# Patient Record
Sex: FEMALE
[De-identification: names, ages, dates, MRNs, and addresses within clinical notes are randomized per-mention and may not be internally consistent; named-entity substitution may affect disease eponyms.]

---

## 2023-10-24 ENCOUNTER — NURSE TRIAGE (OUTPATIENT)
Dept: OTHER | Facility: CLINIC | Age: 68
End: 2023-10-24

## 2023-10-24 NOTE — TELEPHONE ENCOUNTER
Location of patient: [address] [facility] [place] call from [facility] at [facility]; Patient with [employer] Complaint requesting to establish care with Dian Sanchez. Subjective: Caller states \"started with a cough and now breathing difficulty\"     Current Symptoms: cough with breathing difficulty during coughing spells. Some congestion    Onset: a few days ago; unchanged    Associated Symptoms: increased wakefulness    Pain Severity: 0/10; N/A; none    Temperature: no fevers     What has been tried: dayquil    LMP: NA Pregnant: NA    Recommended disposition: See in Office Today. Pt states she does not have a ride today and would like to be seen tomorrow. Provided with the risks of not following disposition and informed them that this is best practice. Care advice provided, patient verbalizes understanding; denies any other questions or concerns; instructed to call back for any new or worsening symptoms. Writer provided warm transfer to [facility] at [facility] for appointment scheduling    Attention Provider: Thank you for allowing me to participate in the care of your patient. The patient was connected to triage in response to information provided to the [facility]. Please do not respond through this encounter as the response is not directed to a shared pool.         Reason for Disposition   SEVERE coughing spells (e.g., whooping sound after coughing, vomiting after coughing)    Protocols used: Cough-ADULT-OH

## 2024-01-09 ENCOUNTER — OFFICE VISIT (OUTPATIENT)
Facility: CLINIC | Age: 69
End: 2024-01-09
Payer: MEDICARE

## 2024-01-09 VITALS
TEMPERATURE: 97 F | HEART RATE: 112 BPM | WEIGHT: 102 LBS | BODY MASS INDEX: 18.07 KG/M2 | SYSTOLIC BLOOD PRESSURE: 124 MMHG | OXYGEN SATURATION: 89 % | RESPIRATION RATE: 16 BRPM | DIASTOLIC BLOOD PRESSURE: 82 MMHG | HEIGHT: 63 IN

## 2024-01-09 DIAGNOSIS — Z11.59 ENCOUNTER FOR HEPATITIS C SCREENING TEST FOR LOW RISK PATIENT: ICD-10-CM

## 2024-01-09 DIAGNOSIS — Z76.89 ENCOUNTER TO ESTABLISH CARE: Primary | ICD-10-CM

## 2024-01-09 DIAGNOSIS — R06.02 SHORTNESS OF BREATH: ICD-10-CM

## 2024-01-09 DIAGNOSIS — R05.8 PRODUCTIVE COUGH: ICD-10-CM

## 2024-01-09 DIAGNOSIS — Z13.220 ENCOUNTER FOR LIPID SCREENING FOR CARDIOVASCULAR DISEASE: ICD-10-CM

## 2024-01-09 DIAGNOSIS — R00.0 TACHYCARDIA: ICD-10-CM

## 2024-01-09 DIAGNOSIS — Z13.6 ENCOUNTER FOR LIPID SCREENING FOR CARDIOVASCULAR DISEASE: ICD-10-CM

## 2024-01-09 DIAGNOSIS — Z87.891 PERSONAL HISTORY OF SMOKING: ICD-10-CM

## 2024-01-09 PROCEDURE — 1123F ACP DISCUSS/DSCN MKR DOCD: CPT

## 2024-01-09 PROCEDURE — 99204 OFFICE O/P NEW MOD 45 MIN: CPT

## 2024-01-09 RX ORDER — PREDNISONE 20 MG/1
20 TABLET ORAL 2 TIMES DAILY
Qty: 10 TABLET | Refills: 0 | Status: SHIPPED | OUTPATIENT
Start: 2024-01-09 | End: 2024-01-14

## 2024-01-09 RX ORDER — DOXYCYCLINE HYCLATE 100 MG
100 TABLET ORAL 2 TIMES DAILY
Qty: 14 TABLET | Refills: 0 | Status: SHIPPED | OUTPATIENT
Start: 2024-01-09 | End: 2024-01-16

## 2024-01-09 RX ORDER — ALBUTEROL SULFATE 90 UG/1
2 AEROSOL, METERED RESPIRATORY (INHALATION) 4 TIMES DAILY PRN
Qty: 18 G | Refills: 0 | Status: SHIPPED | OUTPATIENT
Start: 2024-01-09

## 2024-01-09 SDOH — ECONOMIC STABILITY: HOUSING INSECURITY
IN THE LAST 12 MONTHS, WAS THERE A TIME WHEN YOU DID NOT HAVE A STEADY PLACE TO SLEEP OR SLEPT IN A SHELTER (INCLUDING NOW)?: NO

## 2024-01-09 SDOH — ECONOMIC STABILITY: FOOD INSECURITY: WITHIN THE PAST 12 MONTHS, YOU WORRIED THAT YOUR FOOD WOULD RUN OUT BEFORE YOU GOT MONEY TO BUY MORE.: NEVER TRUE

## 2024-01-09 SDOH — ECONOMIC STABILITY: FOOD INSECURITY: WITHIN THE PAST 12 MONTHS, THE FOOD YOU BOUGHT JUST DIDN'T LAST AND YOU DIDN'T HAVE MONEY TO GET MORE.: NEVER TRUE

## 2024-01-09 SDOH — ECONOMIC STABILITY: INCOME INSECURITY: HOW HARD IS IT FOR YOU TO PAY FOR THE VERY BASICS LIKE FOOD, HOUSING, MEDICAL CARE, AND HEATING?: NOT HARD AT ALL

## 2024-01-09 ASSESSMENT — ENCOUNTER SYMPTOMS
VOMITING: 0
PHOTOPHOBIA: 0
NAUSEA: 0
CONSTIPATION: 0
ABDOMINAL PAIN: 0
CHOKING: 0
TROUBLE SWALLOWING: 0
DIARRHEA: 0
BLOOD IN STOOL: 0
STRIDOR: 0
WHEEZING: 0
BACK PAIN: 0
EYE PAIN: 0
SORE THROAT: 0
SHORTNESS OF BREATH: 1
COUGH: 1
CHEST TIGHTNESS: 0

## 2024-01-09 ASSESSMENT — PATIENT HEALTH QUESTIONNAIRE - PHQ9
SUM OF ALL RESPONSES TO PHQ QUESTIONS 1-9: 0
SUM OF ALL RESPONSES TO PHQ QUESTIONS 1-9: 0
SUM OF ALL RESPONSES TO PHQ9 QUESTIONS 1 & 2: 0
SUM OF ALL RESPONSES TO PHQ QUESTIONS 1-9: 0
1. LITTLE INTEREST OR PLEASURE IN DOING THINGS: 0
SUM OF ALL RESPONSES TO PHQ QUESTIONS 1-9: 0
2. FEELING DOWN, DEPRESSED OR HOPELESS: 0

## 2024-01-09 NOTE — PROGRESS NOTES
Shiela Lam  68 y.o. female  1955  [address] [address]  [id number]     [place] [facility]: Progress Note       Encounter Date: 1/9/2024    Patient presents with the following chief complaint(s)    Chief Complaint   Patient presents with    New Patient    Establish Care    Shortness of Breath        History provided by patient    Assessment and Plan:   1. Encounter to establish care  2. Productive cough  -     XR CHEST (2 VIEWS); Future  -     CBC; Future  -     Comprehensive Metabolic Panel; Future  -     albuterol sulfate HFA (VENTOLIN HFA) 108 (90 Base) MCG/ACT inhaler; Inhale 2 puffs into the lungs 4 times daily as needed for Wheezing, Disp-18 g, R-0Normal  -     predniSONE (DELTASONE) 20 MG tablet; Take 1 tablet by mouth 2 times daily for 5 days, Disp-10 tablet, R-0Normal  -     doxycycline hyclate (VIBRA-TABS) 100 MG tablet; Take 1 tablet by mouth 2 times daily for 7 days, Disp-14 tablet, R-0Normal  3. Shortness of breath  -     XR CHEST (2 VIEWS); Future  -     CBC; Future  -     Comprehensive Metabolic Panel; Future  -     [facility]  -     albuterol sulfate HFA (VENTOLIN HFA) 108 (90 Base) MCG/ACT inhaler; Inhale 2 puffs into the lungs 4 times daily as needed for Wheezing, Disp-18 g, R-0Normal  -     predniSONE (DELTASONE) 20 MG tablet; Take 1 tablet by mouth 2 times daily for 5 days, Disp-10 tablet, R-0Normal  -     doxycycline hyclate (VIBRA-TABS) 100 MG tablet; Take 1 tablet by mouth 2 times daily for 7 days, Disp-14 tablet, R-0Normal  4. Tachycardia  -     [facility] - Wilberto Dang MD, Cardiology, [place]  5. Encounter for hepatitis C screening test for low risk patient  -     Hepatitis C Antibody; Future  6. Encounter for lipid screening for cardiovascular disease  -     Lipid Panel; Future  7. Personal history of smoking  -     [facility]  -     CT LUNG SCREENING

## 2024-01-09 NOTE — PROGRESS NOTES
1. \"Have you been to the ER, urgent care clinic since your last visit?  Hospitalized since your last visit?\" no    2. \"Have you seen or consulted any other health care providers outside of the [facility] since your last visit?\" no     3. For patients aged 45-75: Has the patient had a colonoscopy / FIT/ Cologuard? No      If the patient is female:    4. For patients aged 40-74: Has the patient had a mammogram within the past 2 years? no      5. For patients aged 21-65: Has the patient had a pap smear? No    Pt is here to establish care .  Chief Complaint   Patient presents with    New Patient    Establish Care    Shortness of Breath     BP (!) 162/99 (Site: Right Upper Arm, Position: Sitting, Cuff Size: Small Adult)   Pulse (!) 112   Temp 97 °F (36.1 °C) (Temporal)   Resp 16   Ht 1.6 m (5' 3\")   Wt 46.3 kg (102 lb)   SpO2 (!) 89%   BMI 18.07 kg/m²

## 2024-01-17 ENCOUNTER — TELEPHONE (OUTPATIENT)
Age: 69
End: 2024-01-17

## 2024-03-01 ENCOUNTER — TELEPHONE (OUTPATIENT)
Facility: CLINIC | Age: 69
End: 2024-03-01

## 2024-03-01 NOTE — TELEPHONE ENCOUNTER
Patient fell and broke her hip and was discharged 2/29/24 to home health     Jessi [phone] wants to know if you will follow patient for home health PT

## 2024-05-21 ENCOUNTER — OFFICE VISIT (OUTPATIENT)
Facility: CLINIC | Age: 69
End: 2024-05-21
Payer: MEDICARE

## 2024-05-21 VITALS
HEART RATE: 66 BPM | OXYGEN SATURATION: 87 % | DIASTOLIC BLOOD PRESSURE: 65 MMHG | WEIGHT: 99 LBS | RESPIRATION RATE: 16 BRPM | HEIGHT: 63 IN | TEMPERATURE: 97.8 F | SYSTOLIC BLOOD PRESSURE: 110 MMHG | BODY MASS INDEX: 17.54 KG/M2

## 2024-05-21 DIAGNOSIS — Z12.11 SCREEN FOR COLON CANCER: ICD-10-CM

## 2024-05-21 DIAGNOSIS — F17.210 TOBACCO DEPENDENCE DUE TO CIGARETTES: ICD-10-CM

## 2024-05-21 DIAGNOSIS — R06.02 SHORTNESS OF BREATH: ICD-10-CM

## 2024-05-21 DIAGNOSIS — Z12.31 SCREENING MAMMOGRAM FOR BREAST CANCER: ICD-10-CM

## 2024-05-21 DIAGNOSIS — Z98.890 HISTORY OF REPAIR OF HIP FRACTURE: Primary | ICD-10-CM

## 2024-05-21 DIAGNOSIS — Z87.891 PERSONAL HISTORY OF TOBACCO USE: ICD-10-CM

## 2024-05-21 PROCEDURE — 1123F ACP DISCUSS/DSCN MKR DOCD: CPT

## 2024-05-21 PROCEDURE — 99214 OFFICE O/P EST MOD 30 MIN: CPT

## 2024-05-21 RX ORDER — ALBUTEROL SULFATE 90 UG/1
2 AEROSOL, METERED RESPIRATORY (INHALATION) 4 TIMES DAILY PRN
Qty: 18 G | Refills: 2 | Status: SHIPPED | OUTPATIENT
Start: 2024-05-21

## 2024-05-21 ASSESSMENT — PATIENT HEALTH QUESTIONNAIRE - PHQ9
SUM OF ALL RESPONSES TO PHQ QUESTIONS 1-9: 0
SUM OF ALL RESPONSES TO PHQ QUESTIONS 1-9: 0
SUM OF ALL RESPONSES TO PHQ9 QUESTIONS 1 & 2: 0
2. FEELING DOWN, DEPRESSED OR HOPELESS: NOT AT ALL
SUM OF ALL RESPONSES TO PHQ QUESTIONS 1-9: 0
SUM OF ALL RESPONSES TO PHQ QUESTIONS 1-9: 0
1. LITTLE INTEREST OR PLEASURE IN DOING THINGS: NOT AT ALL

## 2024-05-21 ASSESSMENT — ENCOUNTER SYMPTOMS
BLOOD IN STOOL: 0
SORE THROAT: 0
NAUSEA: 0
TROUBLE SWALLOWING: 0
CONSTIPATION: 0
ABDOMINAL PAIN: 0
EYE PAIN: 0
VOMITING: 0
CHEST TIGHTNESS: 0
DIARRHEA: 0
WHEEZING: 0
PHOTOPHOBIA: 0
BACK PAIN: 0
SHORTNESS OF BREATH: 1
COUGH: 0

## 2024-05-21 NOTE — PROGRESS NOTES
Chief Complaint   Patient presents with    Follow-up     /65   Pulse 66   Temp 97.8 °F (36.6 °C)   Resp 16   Ht 1.6 m (5' 3\")   Wt 44.9 kg (99 lb)   SpO2 (!) 87%   BMI 17.54 kg/m²   \"Have you been to the ER, urgent care clinic since your last visit?  Hospitalized since your last visit?\"    YES - When: approximately 3 months ago.  Where and Why: Hip fracture.    “Have you seen or consulted any other health care providers outside of [facility] since your last visit?”    NO    Have you had a mammogram?”   NO    No breast cancer screening on file         “Have you had a colorectal cancer screening such as a colonoscopy/FIT/Cologuard?    NO    No colonoscopy on file  No cologuard on file  No FIT/FOBT on file   No flexible sigmoidoscopy on file         Click Here for Release of Records Request

## 2024-05-21 NOTE — PROGRESS NOTES
Shiela Lam  68 y.o. female  1955  [address] [address]  [id number]     [place] [facility]: Progress Note       Encounter Date: 5/21/2024    Patient presents with the following chief complaint(s)    Chief Complaint   Patient presents with    Follow-up        History provided by patient    Assessment and Plan:   1. History of repair of hip fracture  2. Shortness of breath  -     albuterol sulfate HFA (VENTOLIN HFA) 108 (90 Base) MCG/ACT inhaler; Inhale 2 puffs into the lungs 4 times daily as needed for Wheezing, Disp-18 g, R-2Normal  3. Tobacco dependence due to cigarettes  Comments:  Counseled on cessation  Orders:  -     CT LUNG SCREENING (INITIAL/ANNUAL); Future  -     albuterol sulfate HFA (VENTOLIN HFA) 108 (90 Base) MCG/ACT inhaler; Inhale 2 puffs into the lungs 4 times daily as needed for Wheezing, Disp-18 g, R-2Normal  4. Personal history of tobacco use  -     CT LUNG SCREENING (INITIAL/ANNUAL); Future  -     albuterol sulfate HFA (VENTOLIN HFA) 108 (90 Base) MCG/ACT inhaler; Inhale 2 puffs into the lungs 4 times daily as needed for Wheezing, Disp-18 g, R-2Normal  5. Screening mammogram for breast cancer  -     [facility] WILBER DIGITAL SCREEN BILATERAL; Future  6. Screen for colon cancer  -     [facility] - Radha Wilkinson MD, Gastroenterology, [place]       Return in about 3 months (around 8/21/2024) for MAWV, Tobacco.  History of Present Illness   Shiela Lam is a 68 y.o. female with past medical history listed, who presents to clinic today for a follow up.    Patient has not obtained previous labs that were ordered.  Patient says that she has been limited by transportation.    Seen at [facility] for hip fracture after falling out her bed Feb 2024. Seeing Dr. Dunn and has not doing home PT. she states that she has noticed an improvement in her ROM with therapy.    Tobacco- smoking 6 cig per day, down from 2PPD. She has pulmonology in 2 weeks which she was given

## 2024-07-10 ENCOUNTER — TELEPHONE (OUTPATIENT)
Facility: CLINIC | Age: 69
End: 2024-07-10